# Patient Record
Sex: MALE | Race: WHITE | Employment: OTHER | URBAN - METROPOLITAN AREA
[De-identification: names, ages, dates, MRNs, and addresses within clinical notes are randomized per-mention and may not be internally consistent; named-entity substitution may affect disease eponyms.]

---

## 2019-01-26 ENCOUNTER — HOSPITAL ENCOUNTER (OUTPATIENT)
Age: 79
Discharge: HOME | End: 2019-01-26
Attending: FAMILY MEDICINE
Payer: MEDICARE

## 2019-01-26 ENCOUNTER — APPOINTMENT (OUTPATIENT)
Dept: GENERAL RADIOLOGY | Age: 79
End: 2019-01-26
Attending: FAMILY MEDICINE
Payer: MEDICARE

## 2019-01-26 VITALS
OXYGEN SATURATION: 97 % | HEART RATE: 78 BPM | SYSTOLIC BLOOD PRESSURE: 158 MMHG | RESPIRATION RATE: 14 BRPM | DIASTOLIC BLOOD PRESSURE: 53 MMHG

## 2019-01-26 DIAGNOSIS — M25.511 ACUTE PAIN OF RIGHT SHOULDER: Primary | ICD-10-CM

## 2019-01-26 PROCEDURE — 99203 OFFICE O/P NEW LOW 30 MIN: CPT

## 2019-01-26 PROCEDURE — 73030 X-RAY EXAM OF SHOULDER: CPT | Performed by: FAMILY MEDICINE

## 2019-01-26 RX ORDER — SIMVASTATIN 10 MG
10 TABLET ORAL NIGHTLY
COMMUNITY

## 2019-01-26 RX ORDER — ASPIRIN AND DIPYRIDAMOLE 25; 200 MG/1; MG/1
1 CAPSULE, EXTENDED RELEASE ORAL 2 TIMES DAILY
COMMUNITY

## 2019-01-26 NOTE — ED INITIAL ASSESSMENT (HPI)
Pt here with complaints of right shoulder pain, pt states he feel down 2 steps 2 days ago and injured his right shoulder, pt states he has a hard time moving the left arm

## 2019-01-26 NOTE — ED PROVIDER NOTES
Patient Seen in: [address]    History   Patient presents with:  Upper Extremity Injury (musculoskeletal)  Fall (musculoskeletal, neurologic)    Stated Complaint: fall rt shoulder injury    HPI    Patient is here to get normal reflexes. No sensory deficit. He exhibits normal muscle tone. Skin: Skin is warm. Capillary refill takes less than 2 seconds. No rash noted. He is not diaphoretic. Psychiatric: He has a normal mood and affect.  His behavior is normal.   Nursing n

## 2019-01-26 NOTE — ED NOTES
Pt discharged home with son, pt instructed to follow up with his primary md if symptoms do not improve

## 2024-08-22 ENCOUNTER — HOSPITAL ENCOUNTER (EMERGENCY)
Facility: HOSPITAL | Age: 84
Discharge: HOME | End: 2024-08-23
Attending: EMERGENCY MEDICINE
Payer: MEDICARE

## 2024-08-22 ENCOUNTER — APPOINTMENT (OUTPATIENT)
Dept: GENERAL RADIOLOGY | Facility: HOSPITAL | Age: 84
End: 2024-08-22
Attending: EMERGENCY MEDICINE
Payer: MEDICARE

## 2024-08-22 DIAGNOSIS — M10.30 ACUTE GOUT DUE TO RENAL IMPAIRMENT, UNSPECIFIED SITE: ICD-10-CM

## 2024-08-22 DIAGNOSIS — M79.674 TOE PAIN, RIGHT: Primary | ICD-10-CM

## 2024-08-22 DIAGNOSIS — R73.9 HYPERGLYCEMIA: ICD-10-CM

## 2024-08-22 LAB
ALBUMIN SERPL-MCNC: 4.2 G/DL (ref 3.2–4.8)
ALBUMIN/GLOB SERPL: 1.3 {RATIO} (ref 1–2)
ALP LIVER SERPL-CCNC: 81 U/L
ALT SERPL-CCNC: 15 U/L
ANION GAP SERPL CALC-SCNC: 10 MMOL/L (ref 0–18)
AST SERPL-CCNC: 28 U/L (ref ?–34)
BASOPHILS # BLD AUTO: 0.03 X10(3) UL (ref 0–0.2)
BASOPHILS NFR BLD AUTO: 0.4 %
BILIRUB SERPL-MCNC: 0.2 MG/DL (ref 0.2–1.1)
BUN BLD-MCNC: 50 MG/DL (ref 9–23)
BUN/CREAT SERPL: 8.2 (ref 10–20)
CALCIUM BLD-MCNC: 9.7 MG/DL (ref 8.7–10.4)
CHLORIDE SERPL-SCNC: 96 MMOL/L (ref 98–112)
CO2 SERPL-SCNC: 27 MMOL/L (ref 21–32)
CREAT BLD-MCNC: 6.1 MG/DL
DEPRECATED RDW RBC AUTO: 49.9 FL (ref 35.1–46.3)
EGFRCR SERPLBLD CKD-EPI 2021: 8 ML/MIN/1.73M2 (ref 60–?)
EOSINOPHIL # BLD AUTO: 0.24 X10(3) UL (ref 0–0.7)
EOSINOPHIL NFR BLD AUTO: 3.5 %
ERYTHROCYTE [DISTWIDTH] IN BLOOD BY AUTOMATED COUNT: 14.9 % (ref 11–15)
GLOBULIN PLAS-MCNC: 3.3 G/DL (ref 2–3.5)
GLUCOSE BLD-MCNC: 434 MG/DL (ref 70–99)
GLUCOSE BLDC GLUCOMTR-MCNC: 392 MG/DL (ref 70–99)
GLUCOSE BLDC GLUCOMTR-MCNC: 422 MG/DL (ref 70–99)
GLUCOSE BLDC GLUCOMTR-MCNC: 424 MG/DL (ref 70–99)
HCT VFR BLD AUTO: 36 %
HGB BLD-MCNC: 12.1 G/DL
IMM GRANULOCYTES # BLD AUTO: 0.02 X10(3) UL (ref 0–1)
IMM GRANULOCYTES NFR BLD: 0.3 %
LYMPHOCYTES # BLD AUTO: 1.39 X10(3) UL (ref 1–4)
LYMPHOCYTES NFR BLD AUTO: 20.3 %
MCH RBC QN AUTO: 30.8 PG (ref 26–34)
MCHC RBC AUTO-ENTMCNC: 33.6 G/DL (ref 31–37)
MCV RBC AUTO: 91.6 FL
MONOCYTES # BLD AUTO: 0.47 X10(3) UL (ref 0.1–1)
MONOCYTES NFR BLD AUTO: 6.9 %
NEUTROPHILS # BLD AUTO: 4.71 X10 (3) UL (ref 1.5–7.7)
NEUTROPHILS # BLD AUTO: 4.71 X10(3) UL (ref 1.5–7.7)
NEUTROPHILS NFR BLD AUTO: 68.6 %
OSMOLALITY SERPL CALC.SUM OF ELEC: 308 MOSM/KG (ref 275–295)
PLATELET # BLD AUTO: 193 10(3)UL (ref 150–450)
POTASSIUM SERPL-SCNC: 4.9 MMOL/L (ref 3.5–5.1)
PROT SERPL-MCNC: 7.5 G/DL (ref 5.7–8.2)
RBC # BLD AUTO: 3.93 X10(6)UL
SODIUM SERPL-SCNC: 133 MMOL/L (ref 136–145)
URATE SERPL-MCNC: 4.1 MG/DL
WBC # BLD AUTO: 6.9 X10(3) UL (ref 4–11)

## 2024-08-22 PROCEDURE — 99284 EMERGENCY DEPT VISIT MOD MDM: CPT

## 2024-08-22 PROCEDURE — 85025 COMPLETE CBC W/AUTO DIFF WBC: CPT | Performed by: EMERGENCY MEDICINE

## 2024-08-22 PROCEDURE — 82962 GLUCOSE BLOOD TEST: CPT

## 2024-08-22 PROCEDURE — 80053 COMPREHEN METABOLIC PANEL: CPT | Performed by: EMERGENCY MEDICINE

## 2024-08-22 PROCEDURE — 84550 ASSAY OF BLOOD/URIC ACID: CPT | Performed by: EMERGENCY MEDICINE

## 2024-08-22 PROCEDURE — 96360 HYDRATION IV INFUSION INIT: CPT

## 2024-08-22 PROCEDURE — 73660 X-RAY EXAM OF TOE(S): CPT | Performed by: EMERGENCY MEDICINE

## 2024-08-23 VITALS
BODY MASS INDEX: 31.89 KG/M2 | WEIGHT: 180 LBS | DIASTOLIC BLOOD PRESSURE: 56 MMHG | TEMPERATURE: 98 F | OXYGEN SATURATION: 96 % | HEIGHT: 63 IN | HEART RATE: 65 BPM | SYSTOLIC BLOOD PRESSURE: 172 MMHG | RESPIRATION RATE: 20 BRPM

## 2024-08-23 LAB — GLUCOSE BLDC GLUCOMTR-MCNC: 296 MG/DL (ref 70–99)

## 2024-08-23 PROCEDURE — 82962 GLUCOSE BLOOD TEST: CPT

## 2024-08-23 RX ORDER — HYDROCODONE BITARTRATE AND ACETAMINOPHEN 5; 325 MG/1; MG/1
1 TABLET ORAL EVERY 6 HOURS PRN
Qty: 10 TABLET | Refills: 0 | Status: SHIPPED | OUTPATIENT
Start: 2024-08-23

## 2024-08-23 RX ORDER — PREDNISONE 20 MG/1
20 TABLET ORAL DAILY
Qty: 5 TABLET | Refills: 0 | Status: SHIPPED | OUTPATIENT
Start: 2024-08-23 | End: 2024-08-28

## 2024-08-23 RX ORDER — HYDROCODONE BITARTRATE AND ACETAMINOPHEN 5; 325 MG/1; MG/1
1 TABLET ORAL ONCE
Status: DISCONTINUED | OUTPATIENT
Start: 2024-08-23 | End: 2024-08-23

## 2024-08-23 NOTE — ED PROVIDER NOTES
Patient Seen in: [facility] Emergency Department      History     Chief Complaint   Patient presents with    Hyperglycemia    Toe Pain     Stated Complaint: High Blood sugar    Subjective:   HPI    Patient presents emergency department complaining of right great toe pain and labile blood sugars.  There is been no fever or chills.  There is no known trauma.  There is no other aggravating or alleviating factors.  There is no vomiting or diarrhea.    Objective:   No pertinent past medical history.            No pertinent past surgical history.              No pertinent social history.            Review of Systems    Positive for stated Chief Complaint: Hyperglycemia and Toe Pain    Other systems are as noted in HPI.  Constitutional and vital signs reviewed.      All other systems reviewed and negative except as noted above.    Physical Exam     ED Triage Vitals   BP 08/22/24 2108 147/48   Pulse 08/22/24 2107 77   Resp 08/22/24 2107 24   Temp 08/22/24 2107 97.8 °F (36.6 °C)   Temp src 08/22/24 2107 Temporal   SpO2 08/22/24 2107 91 %   O2 Device 08/22/24 2107 None (Room air)       Current Vitals:   Vital Signs  BP: (!) 172/56  Pulse: 65  Resp: 20  MAP (mmHg): 90    Oxygen Therapy  SpO2: 96 %  O2 Device: None (Room air)            Physical Exam  Vitals and nursing note reviewed.   Constitutional:       General: He is not in acute distress.     Appearance: He is well-developed.   HENT:      Head: Normocephalic.      Nose: Nose normal.      Mouth/Throat:      Mouth: Mucous membranes are moist.   Eyes:      Conjunctiva/sclera: Conjunctivae normal.   Cardiovascular:      Rate and Rhythm: Normal rate and regular rhythm.      Heart sounds: No murmur heard.  Pulmonary:      Effort: Pulmonary effort is normal. No respiratory distress.      Breath sounds: Normal breath sounds.   Abdominal:      General: There is no distension.      Palpations: Abdomen is soft.      Tenderness: There is no abdominal tenderness.    Musculoskeletal:      Cervical back: Normal range of motion and neck supple.      Comments: The right foot was examined.  There are strong pulses that are easily dopplerable in the dorsalis pedis and posterior tibialis arteries.  The toe is tender to touch without significant swelling.  There is no drainage or erythema.  It is painful to touch and with movement.   Skin:     General: Skin is warm and dry.      Capillary Refill: Capillary refill takes less than 2 seconds.      Findings: No rash.   Neurological:      General: No focal deficit present.      Mental Status: He is alert and oriented to person, place, and time.               ED Course     Labs Reviewed   COMP METABOLIC PANEL (14) - Abnormal; Notable for the following components:       Result Value    Glucose 434 (*)     Sodium 133 (*)     Chloride 96 (*)     BUN 50 (*)     Creatinine 6.10 (*)     BUN/CREA Ratio 8.2 (*)     Calculated Osmolality 308 (*)     eGFR-Cr 8 (*)     All other components within normal limits   CBC WITH DIFFERENTIAL WITH PLATELET - Abnormal; Notable for the following components:    HGB 12.1 (*)     HCT 36.0 (*)     RDW-SD 49.9 (*)     All other components within normal limits   POCT GLUCOSE - Abnormal; Notable for the following components:    POC Glucose  424 (*)     All other components within normal limits   POCT GLUCOSE - Abnormal; Notable for the following components:    POC Glucose  422 (*)     All other components within normal limits   POCT GLUCOSE - Abnormal; Notable for the following components:    POC Glucose  392 (*)     All other components within normal limits   POCT GLUCOSE - Abnormal; Notable for the following components:    POC Glucose  296 (*)     All other components within normal limits   URIC ACID - Normal                      MDM                      Medical Decision Making  Differential diagnosis considered for fracture, cellulitis, gout.    Problems Addressed:  Acute gout due to renal impairment, unspecified  site: acute illness or injury  Toe pain, right: acute illness or injury    Amount and/or Complexity of Data Reviewed  Labs: ordered. Decision-making details documented in ED Course.     Details: Labs reviewed, normal bicarb, kidney function consistent with end-stage renal disease on hemodialysis.  Normal white blood cell count.  Radiology: ordered and independent interpretation performed. Decision-making details documented in ED Course.     Details: X-ray shows significant arthritic changes in the toe  Discussion of management or test interpretation with external provider(s): Patient was given IV fluids here and 2 doses of insulin.  His blood sugar came down to 290 from 450.  I suspect the pain in his toe was due to gout.  I discussed with the patient the possibility of an observation admission as he would be started on prednisone which may also increase his blood sugar.  He is adamant about being discharged home.  I spoke with the son who would like to honor his father's wishes and bring him home.  He came back and will follow-up with her primary care physician.    Risk  Prescription drug management.        Disposition and Plan     Clinical Impression:  1. Toe pain, right    2. Acute gout due to renal impairment, unspecified site    3. Hyperglycemia         Disposition:  Discharge  8/23/2024  1:01 am    Follow-up:  Nilesh Berg MD  [address] [facility]  [address] [place] [id number]  [phone]    Schedule an appointment as soon as possible for a visit      We recommend that you schedule follow up care with a primary care provider within the next three months to obtain basic health screening including reassessment of your blood pressure.      Medications Prescribed:  Discharge Medication List as of 8/23/2024  1:29 AM        START taking these medications    Details   HYDROcodone-acetaminophen 5-325 MG Oral Tab Take 1 tablet by mouth every 6 (six) hours as needed., Normal, Disp-10 tablet, R-0       predniSONE 20 MG Oral Tab Take 1 tablet (20 mg total) by mouth daily for 5 days., Normal, Disp-5 tablet, R-0

## 2024-08-23 NOTE — ED QUICK NOTES
PT refusing to be admitted, MD at bedside.   Rn attempted to call son Nilesh as requested by PT and son before son left, [phone], no answer.

## 2024-08-23 NOTE — ED INITIAL ASSESSMENT (HPI)
Pt to ED with c/o HTN, hyperglycemia, and right toe pain x2 days. Pt denies n/v or fever.  Pt denies falls or injuries.

## 2024-08-23 NOTE — ED QUICK NOTES
PT to ED with son. Son reporting R toe pain x 3 days and high blood glucose at home. Dialysis pt, fistula in L arm.